# Patient Record
Sex: MALE | Race: OTHER | Employment: UNEMPLOYED | URBAN - METROPOLITAN AREA
[De-identification: names, ages, dates, MRNs, and addresses within clinical notes are randomized per-mention and may not be internally consistent; named-entity substitution may affect disease eponyms.]

---
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
## 2020-06-30 NOTE — PROGRESS NOTES
Mook  was seen at clinic today for HPV #3 and Menveo #2 per visit notes on 11/28/16 with DMM. Offered Flu shot but mom declined, will come back at another time for it. Reviewed vis sheet with parent and administered vaccine(s).  Monitored patient 1 Patient Education        Patient Education        Hernia: Care Instructions  Your Care Instructions     A hernia develops when tissue bulges through a weak spot in the wall of your belly. The groin area and the navel are common areas for a hernia. A hernia can also develop near the area of a surgery you had before. Pressure from lifting, straining, or coughing can tear the weak area, causing the hernia to bulge and be painful. If you cannot push a hernia back into place, the tissue may become trapped outside the belly wall. If the hernia gets twisted and loses its blood supply, it will swell and die. This is called a strangulated hernia. It usually causes a lot of pain. It needs treatment right away. Some hernias need to be repaired to prevent a strangulated hernia. If your hernia causes symptoms or is large, you may need surgery. Follow-up care is a key part of your treatment and safety. Be sure to make and go to all appointments, and call your doctor if you are having problems. It's also a good idea to know your test results and keep a list of the medicines you take. How can you care for yourself at home? · Take care when lifting heavy objects. · Stay at a healthy weight. · Do not smoke. Smoking can cause coughing, which can cause your hernia to bulge. If you need help quitting, talk to your doctor about stop-smoking programs and medicines. These can increase your chances of quitting for good. · Talk with your doctor before wearing a corset or truss for a hernia. These devices are not recommended for treating hernias and sometimes can do more harm than good. There may be certain situations when your doctor thinks a truss would work, but these are rare. When should you call for help? Call your doctor now or seek immediate medical care if:  · You have new or worse belly pain. · You are vomiting. · You cannot pass stools or gas.   · You cannot push the hernia back into place with gentle pressure when you are lying down. · The area over the hernia turns red or becomes tender. Watch closely for changes in your health, and be sure to contact your doctor if you have any problems. Where can you learn more? Go to http://amrit-colt.info/  Enter C129 in the search box to learn more about \"Hernia: Care Instructions. \"  Current as of: August 12, 2019               Content Version: 12.5  © 2558-4747 [employer]. Care instructions adapted under license by [employer] (which disclaims liability or warranty for this information). If you have questions about a medical condition or this instruction, always ask your healthcare professional. [address] any warranty or liability for your use of this information.

## 2020-08-11 ENCOUNTER — OFFICE VISIT (OUTPATIENT)
Dept: INTERNAL MEDICINE CLINIC | Facility: CLINIC | Age: 20
End: 2020-08-11
Payer: COMMERCIAL

## 2020-08-11 VITALS
SYSTOLIC BLOOD PRESSURE: 124 MMHG | OXYGEN SATURATION: 98 % | HEIGHT: 67.9 IN | TEMPERATURE: 97 F | DIASTOLIC BLOOD PRESSURE: 84 MMHG | WEIGHT: 171.63 LBS | BODY MASS INDEX: 26.31 KG/M2 | HEART RATE: 80 BPM

## 2020-08-11 DIAGNOSIS — R41.840 CONCENTRATION DEFICIT: ICD-10-CM

## 2020-08-11 DIAGNOSIS — Q66.50 CONGENITAL PES PLANUS, UNSPECIFIED LATERALITY: ICD-10-CM

## 2020-08-11 DIAGNOSIS — Z00.00 PHYSICAL EXAM: Primary | ICD-10-CM

## 2020-08-11 PROCEDURE — 99385 PREV VISIT NEW AGE 18-39: CPT | Performed by: INTERNAL MEDICINE

## 2020-08-11 PROCEDURE — 3079F DIAST BP 80-89 MM HG: CPT | Performed by: INTERNAL MEDICINE

## 2020-08-11 PROCEDURE — 3074F SYST BP LT 130 MM HG: CPT | Performed by: INTERNAL MEDICINE

## 2020-08-11 PROCEDURE — 3008F BODY MASS INDEX DOCD: CPT | Performed by: INTERNAL MEDICINE

## 2020-08-11 NOTE — PROGRESS NOTES
Jessie Terry is a 21year old male who presents for a complete physical exam.   HPI:   Pt complains of:    Patient presents with:  Physical: establish care. 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 Diet is average, plays sports for exercise.   Home learning wit Negative for Dysuria and urinary frequency. Endocrine: Negative for Cold intolerance and heat intolerance. Neuro: Negative for Gait disturbance and memory impairment. Psych: Negative for Anxiety and depression.   Integumentary: Negative for Hives and miguel ángel IGG ANTIBODY; Future    2. Congenital pes planus, unspecified laterality  Stable cont monitoring    3. Concentration deficit  Think about the heritage group consult  Herrit[employer] group:  [facility]  [address] [place] [address]    JADEN.

## 2021-01-10 ENCOUNTER — HOSPITAL ENCOUNTER (OUTPATIENT)
Age: 21
Discharge: HOME | End: 2021-01-10
Payer: COMMERCIAL

## 2021-01-10 VITALS
SYSTOLIC BLOOD PRESSURE: 122 MMHG | WEIGHT: 170 LBS | RESPIRATION RATE: 18 BRPM | HEART RATE: 69 BPM | OXYGEN SATURATION: 98 % | BODY MASS INDEX: 25.76 KG/M2 | HEIGHT: 68 IN | TEMPERATURE: 98 F | DIASTOLIC BLOOD PRESSURE: 74 MMHG

## 2021-01-10 DIAGNOSIS — Z20.822 EXPOSURE TO COVID-19 VIRUS: ICD-10-CM

## 2021-01-10 DIAGNOSIS — Z20.822 ENCOUNTER FOR LABORATORY TESTING FOR COVID-19 VIRUS: Primary | ICD-10-CM

## 2021-01-10 PROCEDURE — 99202 OFFICE O/P NEW SF 15 MIN: CPT | Performed by: NURSE PRACTITIONER

## 2021-01-10 NOTE — ED PROVIDER NOTES
Patient presents with:  Covid-19 Test      HPI:     Jessie Terry is a 21year old male who presents for a Covid test.  He states he had a recent exposure. He denies any symptoms or complaints. He appears well and nontoxic.   [facility] asessment screens on file        Physically abused: Not on file        Forced sexual activity: Not on file    Other Topics      Concerns:        Not on file    Social History Narrative      Not on file       ROS:   Positive for stated complaint: COVID test, exposure  All ot today.    Follow Up with:  DO Christ Montesinos [place] [address] [id number] [phone]    Schedule an appointment as soon as possible for a visit   As needed

## 2021-01-11 ENCOUNTER — TELEPHONE (OUTPATIENT)
Dept: INTERNAL MEDICINE CLINIC | Facility: CLINIC | Age: 21
End: 2021-01-11

## 2021-01-11 DIAGNOSIS — U07.1 COVID-19: Primary | ICD-10-CM

## 2021-01-11 PROCEDURE — 99441 PHONE E/M BY PHYS 5-10 MIN: CPT | Performed by: INTERNAL MEDICINE

## 2021-01-11 RX ORDER — AZITHROMYCIN 250 MG/1
TABLET, FILM COATED ORAL
Qty: 6 TABLET | Refills: 0 | Status: SHIPPED | OUTPATIENT
Start: 2021-01-11 | End: 2021-01-16

## 2021-01-11 NOTE — TELEPHONE ENCOUNTER
Virtual Visit/Telephone Note    Qasim Glover verbally consents to a Virtual/Telephone Check-In service on 04/07/20. Patient understands and accepts financial responsibility for any deductible, co-insurance and/or co-pays associated with this service. component of Covid  Keep in quarantine, and go on walks and test your breathing, if something is getting worse, I want you in the emergency room for an evaluation.   Realize this will take some time to recover, you will not feel right for a number of weeks,

## 2021-01-12 NOTE — TELEPHONE ENCOUNTER
Nursing tried to see if tomorrow he is hooked up to the [employer] account, copy and paste my assessment and plan of my last visit with him on 1/11/21 in a message for him to review

## 2021-01-12 NOTE — TELEPHONE ENCOUNTER
DONE.      Subject Delivery           PLEASE REVIEW 1/11 ASSESSMENT & PLAN FROM YOUR RECENT VISIT  1/12/2021 2:45 PM     To: Eugenie Glover      From: Dell Weathers CMA      Created: 1/12/2021 2:45 PM        ASSESSMENT AND PLAN:  Barrie Rubio is a 21 y

## 2021-01-13 LAB — SARS-COV-2 BY PCR: DETECTED

## 2021-07-29 ENCOUNTER — NURSE ONLY (OUTPATIENT)
Dept: INTERNAL MEDICINE CLINIC | Facility: HOSPITAL | Age: 21
End: 2021-07-29
Attending: EMERGENCY MEDICINE

## 2021-07-29 DIAGNOSIS — Z00.00 WELLNESS EXAMINATION: Primary | ICD-10-CM

## 2021-07-29 PROCEDURE — 86480 TB TEST CELL IMMUN MEASURE: CPT

## 2021-07-30 LAB
M TB IFN-G CD4+ T-CELLS BLD-ACNC: 0.02 IU/ML
M TB TUBERC IFN-G BLD QL: NEGATIVE
M TB TUBERC IGNF/MITOGEN IGNF CONTROL: >10 IU/ML
QFT TB1 AG MINUS NIL: 0 IU/ML
QFT TB2 AG MINUS NIL: 0.01 IU/ML

## 2021-10-20 ENCOUNTER — TELEPHONE (OUTPATIENT)
Dept: INTERNAL MEDICINE CLINIC | Facility: CLINIC | Age: 21
End: 2021-10-20

## 2021-10-20 DIAGNOSIS — Z23 IMMUNIZATION DUE: Primary | ICD-10-CM

## 2021-10-22 ENCOUNTER — NURSE ONLY (OUTPATIENT)
Dept: INTERNAL MEDICINE CLINIC | Facility: CLINIC | Age: 21
End: 2021-10-22
Payer: COMMERCIAL

## 2021-10-22 DIAGNOSIS — Z23 NEED FOR VACCINATION: Primary | ICD-10-CM

## 2021-10-22 PROCEDURE — 90686 IIV4 VACC NO PRSV 0.5 ML IM: CPT | Performed by: INTERNAL MEDICINE

## 2021-10-22 PROCEDURE — 90471 IMMUNIZATION ADMIN: CPT | Performed by: INTERNAL MEDICINE

## 2021-10-22 PROCEDURE — 90715 TDAP VACCINE 7 YRS/> IM: CPT | Performed by: INTERNAL MEDICINE

## 2021-10-22 PROCEDURE — 90472 IMMUNIZATION ADMIN EACH ADD: CPT | Performed by: INTERNAL MEDICINE

## 2021-10-22 NOTE — PROGRESS NOTES
Pt presents for TDAP immunization. Name and  verified. Order active in [address] Patient tolerated injection well. Immunization record printed for patient per pt request. VIS forms given.

## 2022-07-18 ENCOUNTER — OFFICE VISIT (OUTPATIENT)
Dept: INTERNAL MEDICINE CLINIC | Facility: CLINIC | Age: 22
End: 2022-07-18
Payer: COMMERCIAL

## 2022-07-18 VITALS
SYSTOLIC BLOOD PRESSURE: 122 MMHG | OXYGEN SATURATION: 99 % | DIASTOLIC BLOOD PRESSURE: 68 MMHG | TEMPERATURE: 98 F | BODY MASS INDEX: 26.37 KG/M2 | WEIGHT: 174 LBS | HEIGHT: 68 IN | HEART RATE: 72 BPM

## 2022-07-18 DIAGNOSIS — S89.91XA INJURY OF RIGHT KNEE, INITIAL ENCOUNTER: Primary | ICD-10-CM

## 2022-07-18 DIAGNOSIS — S83.411A SPRAIN OF MEDIAL COLLATERAL LIGAMENT OF RIGHT KNEE, INITIAL ENCOUNTER: ICD-10-CM

## 2022-07-18 PROCEDURE — 3074F SYST BP LT 130 MM HG: CPT | Performed by: INTERNAL MEDICINE

## 2022-07-18 PROCEDURE — 99214 OFFICE O/P EST MOD 30 MIN: CPT | Performed by: INTERNAL MEDICINE

## 2022-07-18 PROCEDURE — 3078F DIAST BP <80 MM HG: CPT | Performed by: INTERNAL MEDICINE

## 2022-07-18 PROCEDURE — 3008F BODY MASS INDEX DOCD: CPT | Performed by: INTERNAL MEDICINE

## 2022-07-18 NOTE — PATIENT INSTRUCTIONS
1. Injury of right knee, initial encounter  Lets see if we can get the x-ray and MRI done in the upcoming week, most insurances require you to do the x-ray first but I do like the idea of the MRI at some point in time when they would cover it  Lets keep using ice and ibuprofen, if you are using ibuprofen 600 mg 3 times daily taken with food for the next at least 5 to 7 days, icing on this area as discussed  Light stretching for now until may be working with the physical therapist  I will notify you with results when I see them  - PHYSICAL THERAPY EXTERNAL  - MRI KNEE, RIGHT (CPT=73721); Future  - XR KNEE (3 VIEWS), RIGHT (CPT=73562); Future    2. Sprain of medial collateral ligament of right knee, initial encounter  Lets think about the physical therapist, setting this up at somewhere that is close to local [facility] on [address] before, they do a nice job  I do think you are dealing with a second-degree sprain here of the MCL ligament without ACL involvement, but we can let the imaging determine what is exactly here  If we think we need an orthopedic doctor, and will only be of some things not coming together here.   Let me know if you need a referral in that department  - PHYSICAL THERAPY EXTERNAL

## 2022-07-25 ENCOUNTER — HOSPITAL ENCOUNTER (OUTPATIENT)
Dept: MRI IMAGING | Facility: HOSPITAL | Age: 22
Discharge: HOME | End: 2022-07-25
Attending: INTERNAL MEDICINE
Payer: COMMERCIAL

## 2022-07-25 DIAGNOSIS — S89.91XA INJURY OF RIGHT KNEE, INITIAL ENCOUNTER: ICD-10-CM

## 2022-07-25 PROCEDURE — 73721 MRI JNT OF LWR EXTRE W/O DYE: CPT | Performed by: INTERNAL MEDICINE

## 2022-10-27 ENCOUNTER — IMMUNIZATION (OUTPATIENT)
Dept: LAB | Facility: HOSPITAL | Age: 22
End: 2022-10-27
Attending: PREVENTIVE MEDICINE
Payer: COMMERCIAL

## 2022-10-27 DIAGNOSIS — Z23 NEED FOR VACCINATION: Primary | ICD-10-CM

## 2022-10-27 PROCEDURE — 90471 IMMUNIZATION ADMIN: CPT

## 2023-10-19 NOTE — TELEPHONE ENCOUNTER
Form completed. Spoke to patient and notified. Pt decided he would like to  the form instead of having it faxed. Form placed at [facility] for pickup. Copy to scanning.
Lm for patient/form was faxed  Original back at the [address] if he wants to  at a later date
Mother called  Scheduled patient for flu vaccine Friday morning, Oct 22  Pt also needs a tetanus booster for college    Any questions call pt at cell# [phone]
Okay with me for both vaccines requested, nursing coordinate orders in communication with the patient
Patient dropped off Flu Vaccine Adm record that needs to be filled out by office for work. Patient would like to  tomorrow. Placed in Dr. Courtney Blanco.     Best call back number [phone]
Patient is calling back to check the status of the form dropped off yesterday. Patient needs the form completed today. Patient would like the from faxed to the location it needs to go to.  Patient did not have the fax number, but will call back with that nu
Pended TDAP and Routed to MD to review patient request below.
Pt called  Instead of picking up please send form to [employer],  fax# [phone]    Form faxed/confirmation was received
Pt received TDAP and flu vaccines on 10/22/21. Nothing further at this time.
Detail Level: Zone
Detail Level: Generalized
Detail Level: Simple
Detail Level: Detailed

## (undated) NOTE — LETTER
10/2/2017              Qasim Glover        [address]        [facility] [address] [facility] [address]         Immunization History   Administered Date(s) Administered   • >=3 YRS FLUZONE OR FLUARIX QUAD PRESERVE FREE SINGLE DOSE (67939) FLU CLINIC 11/28/2016   • DTA

## (undated) NOTE — LETTER
7/18/2022          To Whom It May Concern:    Morro Cox is currently under my medical care and may not return to work at this time. Please excuse Qasim for 7 days. He may return to work on 7/23. Activity is restricted as follows: none. If you require additional information please contact our office.         Sincerely,    Mariposa Harding DO          Document generated by:  Mariposa Harding DO

## (undated) NOTE — LETTER
Name:  Cecilia Infante Year:  12th Grade Class: Student ID No.:   Address:  [address] [place] [id number] Phone:  [phone] (home) [phone] (work) : 118 16year old   Name Relationship [address] Work Phone Home Phone Mobile Phone   1 implanted defibrillator? 12. Has anyone in your family had unexplained fainting, seizures, or near drowning?      BONE AND JOINT QUESTIONS Yes No   17. Have you ever had an injury to a bone, muscle, ligament, or tendon that caused you to miss a practice 39.Have you ever been unable to move your arms / legs after being hit /fall? 40. Have you ever become ill while exercising in the heat?     41. Do you get frequent muscle cramps when exercising? 42.  Do you or someone in your family have sickle cell · Murmurs (auscultation standing, supine, +/- Valsalva)  · Location of point of maximal impulse (PMI) Yes    Pulses Yes    Lungs Yes    Abdomen Yes    Genitourinary (males only)* Yes    Skin:  HSV, lesions suggestive of MRSA, tinea corporis Yes    Neurolog may be asked to submit to testing for the presence of performance-enhancing substances in my/his/her body either during [employer] state series events or during the school day, and I/our student do/does hereby agree to submit to such testing and analysis by a ce

## (undated) NOTE — LETTER
VACCINE ADMINISTRATION RECORD  PARENT / GUARDIAN APPROVAL  Date: 10/2/2017  Vaccine administered to: Qasim Glover     : 2000    MRN: [id number]    A copy of the appropriate Centers for Disease Control and Prevention Vaccine Information statement h